# Patient Record
Sex: MALE | Race: WHITE | HISPANIC OR LATINO | Employment: OTHER | URBAN - METROPOLITAN AREA
[De-identification: names, ages, dates, MRNs, and addresses within clinical notes are randomized per-mention and may not be internally consistent; named-entity substitution may affect disease eponyms.]

---

## 2021-07-26 ENCOUNTER — PATIENT OUTREACH (OUTPATIENT)
Dept: ADMINISTRATIVE | Facility: HOSPITAL | Age: 61
End: 2021-07-26

## 2021-08-05 ENCOUNTER — HOSPITAL ENCOUNTER (EMERGENCY)
Facility: HOSPITAL | Age: 61
Discharge: HOME | End: 2021-08-05
Attending: EMERGENCY MEDICINE
Payer: MEDICAID

## 2021-08-05 VITALS
SYSTOLIC BLOOD PRESSURE: 140 MMHG | OXYGEN SATURATION: 96 % | HEART RATE: 92 BPM | BODY MASS INDEX: 21.47 KG/M2 | HEIGHT: 70 IN | WEIGHT: 150 LBS | TEMPERATURE: 99 F | RESPIRATION RATE: 18 BRPM | DIASTOLIC BLOOD PRESSURE: 88 MMHG

## 2021-08-05 DIAGNOSIS — Z20.822 LAB TEST NEGATIVE FOR COVID-19 VIRUS: ICD-10-CM

## 2021-08-05 DIAGNOSIS — R06.02 SHORTNESS OF BREATH: Primary | ICD-10-CM

## 2021-08-05 LAB
CTP QC/QA: YES
HCV AB SERPL QL IA: NEGATIVE
HIV 1+2 AB+HIV1 P24 AG SERPL QL IA: NEGATIVE
SARS-COV-2 RDRP RESP QL NAA+PROBE: NEGATIVE

## 2021-08-05 PROCEDURE — 87389 HIV-1 AG W/HIV-1&-2 AB AG IA: CPT | Performed by: EMERGENCY MEDICINE

## 2021-08-05 PROCEDURE — 93010 ELECTROCARDIOGRAM REPORT: CPT | Mod: ,,, | Performed by: INTERNAL MEDICINE

## 2021-08-05 PROCEDURE — 99284 PR EMERGENCY DEPT VISIT,LEVEL IV: ICD-10-PCS | Mod: CR,CS,, | Performed by: EMERGENCY MEDICINE

## 2021-08-05 PROCEDURE — U0002 COVID-19 LAB TEST NON-CDC: HCPCS | Performed by: EMERGENCY MEDICINE

## 2021-08-05 PROCEDURE — 93010 EKG 12-LEAD: ICD-10-PCS | Mod: ,,, | Performed by: INTERNAL MEDICINE

## 2021-08-05 PROCEDURE — 86803 HEPATITIS C AB TEST: CPT | Performed by: EMERGENCY MEDICINE

## 2021-08-05 PROCEDURE — 99284 EMERGENCY DEPT VISIT MOD MDM: CPT | Mod: CR,CS,, | Performed by: EMERGENCY MEDICINE

## 2021-08-05 PROCEDURE — 99284 EMERGENCY DEPT VISIT MOD MDM: CPT

## 2021-08-05 PROCEDURE — 93005 ELECTROCARDIOGRAM TRACING: CPT

## 2021-12-07 ENCOUNTER — HOSPITAL ENCOUNTER (EMERGENCY)
Facility: HOSPITAL | Age: 61
Discharge: HOME | End: 2021-12-07
Attending: EMERGENCY MEDICINE
Payer: MEDICAID

## 2021-12-07 VITALS
HEART RATE: 86 BPM | BODY MASS INDEX: 21.47 KG/M2 | TEMPERATURE: 99 F | DIASTOLIC BLOOD PRESSURE: 92 MMHG | HEIGHT: 70 IN | SYSTOLIC BLOOD PRESSURE: 176 MMHG | WEIGHT: 150 LBS | RESPIRATION RATE: 18 BRPM | OXYGEN SATURATION: 97 %

## 2021-12-07 DIAGNOSIS — I10 HYPERTENSION, UNSPECIFIED TYPE: ICD-10-CM

## 2021-12-07 DIAGNOSIS — Z76.0 MEDICATION REFILL: ICD-10-CM

## 2021-12-07 DIAGNOSIS — K08.89 LOOSE, TEETH: Primary | ICD-10-CM

## 2021-12-07 PROCEDURE — 99284 EMERGENCY DEPT VISIT MOD MDM: CPT

## 2021-12-07 PROCEDURE — 99284 PR EMERGENCY DEPT VISIT,LEVEL IV: ICD-10-PCS | Mod: ,,, | Performed by: EMERGENCY MEDICINE

## 2021-12-07 PROCEDURE — 99284 EMERGENCY DEPT VISIT MOD MDM: CPT | Mod: ,,, | Performed by: EMERGENCY MEDICINE

## 2021-12-07 RX ORDER — GABAPENTIN 300 MG/1
300 CAPSULE ORAL 3 TIMES DAILY
Qty: 90 CAPSULE | Refills: 0 | Status: ON HOLD | OUTPATIENT
Start: 2021-12-07 | End: 2023-08-20

## 2021-12-07 RX ORDER — METHOCARBAMOL 500 MG/1
500 TABLET, FILM COATED ORAL 3 TIMES DAILY PRN
Qty: 30 TABLET | Refills: 0 | Status: ON HOLD | OUTPATIENT
Start: 2021-12-07 | End: 2023-08-20

## 2021-12-29 ENCOUNTER — PES CALL (OUTPATIENT)
Dept: ADMINISTRATIVE | Facility: CLINIC | Age: 61
End: 2021-12-29
Payer: MEDICAID

## 2023-08-20 ENCOUNTER — HOSPITAL ENCOUNTER (OUTPATIENT)
Facility: HOSPITAL | Age: 63
LOS: 1 days | Discharge: HOME | End: 2023-08-20
Attending: EMERGENCY MEDICINE | Admitting: STUDENT IN AN ORGANIZED HEALTH CARE EDUCATION/TRAINING PROGRAM
Payer: MEDICAID

## 2023-08-20 VITALS
RESPIRATION RATE: 18 BRPM | BODY MASS INDEX: 23.54 KG/M2 | DIASTOLIC BLOOD PRESSURE: 76 MMHG | HEART RATE: 59 BPM | SYSTOLIC BLOOD PRESSURE: 129 MMHG | OXYGEN SATURATION: 97 % | WEIGHT: 150 LBS | HEIGHT: 67 IN | TEMPERATURE: 99 F

## 2023-08-20 DIAGNOSIS — R07.9 CHEST PAIN: ICD-10-CM

## 2023-08-20 DIAGNOSIS — Z78.9 ALCOHOL USE: ICD-10-CM

## 2023-08-20 DIAGNOSIS — F14.90 COCAINE USE: ICD-10-CM

## 2023-08-20 DIAGNOSIS — E16.2 HYPOGLYCEMIA: Primary | ICD-10-CM

## 2023-08-20 DIAGNOSIS — R09.02 HYPOXIA: ICD-10-CM

## 2023-08-20 DIAGNOSIS — R00.2 PALPITATIONS: ICD-10-CM

## 2023-08-20 PROBLEM — R79.89 ELEVATED LACTIC ACID LEVEL: Status: ACTIVE | Noted: 2023-08-20

## 2023-08-20 PROBLEM — F10.90 ALCOHOL USE: Status: ACTIVE | Noted: 2023-08-20

## 2023-08-20 PROBLEM — R55 NEAR SYNCOPE: Status: ACTIVE | Noted: 2023-08-20

## 2023-08-20 PROBLEM — J96.91 RESPIRATORY FAILURE WITH HYPOXIA: Status: ACTIVE | Noted: 2023-08-20

## 2023-08-20 LAB
ALBUMIN SERPL BCP-MCNC: 3 G/DL (ref 3.5–5.2)
ALLENS TEST: ABNORMAL
ALLENS TEST: ABNORMAL
ALP SERPL-CCNC: 49 U/L (ref 55–135)
ALT SERPL W/O P-5'-P-CCNC: 16 U/L (ref 10–44)
AMPHET+METHAMPHET UR QL: NEGATIVE
ANION GAP SERPL CALC-SCNC: 15 MMOL/L (ref 8–16)
AST SERPL-CCNC: 31 U/L (ref 10–40)
BACTERIA #/AREA URNS AUTO: NORMAL /HPF
BARBITURATES UR QL SCN>200 NG/ML: NEGATIVE
BASOPHILS # BLD AUTO: 0.02 K/UL (ref 0–0.2)
BASOPHILS NFR BLD: 0.3 % (ref 0–1.9)
BENZODIAZ UR QL SCN>200 NG/ML: NEGATIVE
BILIRUB SERPL-MCNC: 0.3 MG/DL (ref 0.1–1)
BILIRUB UR QL STRIP: NEGATIVE
BUN SERPL-MCNC: 15 MG/DL (ref 6–30)
BUN SERPL-MCNC: 16 MG/DL (ref 8–23)
BZE UR QL SCN: ABNORMAL
CALCIUM SERPL-MCNC: 7.3 MG/DL (ref 8.7–10.5)
CANNABINOIDS UR QL SCN: NEGATIVE
CHLORIDE SERPL-SCNC: 110 MMOL/L (ref 95–110)
CHLORIDE SERPL-SCNC: 111 MMOL/L (ref 95–110)
CK SERPL-CCNC: 150 U/L (ref 20–200)
CLARITY UR REFRACT.AUTO: CLEAR
CO2 SERPL-SCNC: 15 MMOL/L (ref 23–29)
COLOR UR AUTO: YELLOW
CREAT SERPL-MCNC: 0.9 MG/DL (ref 0.5–1.4)
CREAT SERPL-MCNC: 0.9 MG/DL (ref 0.5–1.4)
CREAT UR-MCNC: 66 MG/DL (ref 23–375)
DIFFERENTIAL METHOD: ABNORMAL
EOSINOPHIL # BLD AUTO: 0 K/UL (ref 0–0.5)
EOSINOPHIL NFR BLD: 0.3 % (ref 0–8)
ERYTHROCYTE [DISTWIDTH] IN BLOOD BY AUTOMATED COUNT: 13.2 % (ref 11.5–14.5)
EST. GFR  (NO RACE VARIABLE): >60 ML/MIN/1.73 M^2
ETHANOL SERPL-MCNC: 175 MG/DL
ETHANOL UR-MCNC: 188 MG/DL
GLUCOSE SERPL-MCNC: 203 MG/DL (ref 70–110)
GLUCOSE SERPL-MCNC: 253 MG/DL (ref 70–110)
GLUCOSE UR QL STRIP: ABNORMAL
HCO3 UR-SCNC: 16.5 MMOL/L (ref 24–28)
HCT VFR BLD AUTO: 38 % (ref 40–54)
HCT VFR BLD CALC: 31 %PCV (ref 36–54)
HCV AB SERPL QL IA: NORMAL
HGB BLD-MCNC: 12 G/DL (ref 14–18)
HGB UR QL STRIP: NEGATIVE
HIV 1+2 AB+HIV1 P24 AG SERPL QL IA: NORMAL
HYALINE CASTS UR QL AUTO: 1 /LPF
IMM GRANULOCYTES # BLD AUTO: 0.03 K/UL (ref 0–0.04)
IMM GRANULOCYTES NFR BLD AUTO: 0.4 % (ref 0–0.5)
KETONES UR QL STRIP: ABNORMAL
LACTATE SERPL-SCNC: 1.7 MMOL/L (ref 0.5–2.2)
LDH SERPL L TO P-CCNC: 3.71 MMOL/L (ref 0.5–2.2)
LEUKOCYTE ESTERASE UR QL STRIP: NEGATIVE
LYMPHOCYTES # BLD AUTO: 2.4 K/UL (ref 1–4.8)
LYMPHOCYTES NFR BLD: 34.6 % (ref 18–48)
MAGNESIUM SERPL-MCNC: 1.9 MG/DL (ref 1.6–2.6)
MCH RBC QN AUTO: 30.2 PG (ref 27–31)
MCHC RBC AUTO-ENTMCNC: 31.6 G/DL (ref 32–36)
MCV RBC AUTO: 96 FL (ref 82–98)
METHADONE UR QL SCN>300 NG/ML: NEGATIVE
MICROSCOPIC COMMENT: NORMAL
MONOCYTES # BLD AUTO: 0.5 K/UL (ref 0.3–1)
MONOCYTES NFR BLD: 6.8 % (ref 4–15)
NEUTROPHILS # BLD AUTO: 4 K/UL (ref 1.8–7.7)
NEUTROPHILS NFR BLD: 57.6 % (ref 38–73)
NITRITE UR QL STRIP: NEGATIVE
NRBC BLD-RTO: 0 /100 WBC
OPIATES UR QL SCN: NEGATIVE
PCO2 BLDA: 34.6 MMHG (ref 35–45)
PCP UR QL SCN>25 NG/ML: NEGATIVE
PH SMN: 7.29 [PH] (ref 7.35–7.45)
PH UR STRIP: 5 [PH] (ref 5–8)
PLATELET # BLD AUTO: 314 K/UL (ref 150–450)
PMV BLD AUTO: 9.5 FL (ref 9.2–12.9)
PO2 BLDA: 27 MMHG (ref 40–60)
POC BE: -10 MMOL/L
POC IONIZED CALCIUM: 0.94 MMOL/L (ref 1.06–1.42)
POC SATURATED O2: 44 % (ref 95–100)
POC TCO2 (MEASURED): 17 MMOL/L (ref 23–29)
POC TCO2: 18 MMOL/L (ref 24–29)
POCT GLUCOSE: 138 MG/DL (ref 70–110)
POCT GLUCOSE: 35 MG/DL (ref 70–110)
POCT GLUCOSE: 56 MG/DL (ref 70–110)
POCT GLUCOSE: 74 MG/DL (ref 70–110)
POTASSIUM BLD-SCNC: 3.7 MMOL/L (ref 3.5–5.1)
POTASSIUM SERPL-SCNC: 3.8 MMOL/L (ref 3.5–5.1)
PROT SERPL-MCNC: 4.8 G/DL (ref 6–8.4)
PROT UR QL STRIP: NEGATIVE
RBC # BLD AUTO: 3.97 M/UL (ref 4.6–6.2)
RBC #/AREA URNS AUTO: 0 /HPF (ref 0–4)
SAMPLE: ABNORMAL
SARS-COV-2 RDRP RESP QL NAA+PROBE: NEGATIVE
SITE: ABNORMAL
SITE: ABNORMAL
SODIUM BLD-SCNC: 143 MMOL/L (ref 136–145)
SODIUM SERPL-SCNC: 141 MMOL/L (ref 136–145)
SP GR UR STRIP: 1.01 (ref 1–1.03)
TOXICOLOGY INFORMATION: ABNORMAL
TROPONIN I SERPL DL<=0.01 NG/ML-MCNC: 0.01 NG/ML (ref 0–0.03)
TROPONIN I SERPL DL<=0.01 NG/ML-MCNC: 0.01 NG/ML (ref 0–0.03)
URN SPEC COLLECT METH UR: ABNORMAL
WBC # BLD AUTO: 6.9 K/UL (ref 3.9–12.7)
WBC #/AREA URNS AUTO: 0 /HPF (ref 0–5)
YEAST UR QL AUTO: NORMAL

## 2023-08-20 PROCEDURE — 99900035 HC TECH TIME PER 15 MIN (STAT)

## 2023-08-20 PROCEDURE — 82803 BLOOD GASES ANY COMBINATION: CPT

## 2023-08-20 PROCEDURE — 80053 COMPREHEN METABOLIC PANEL: CPT | Performed by: EMERGENCY MEDICINE

## 2023-08-20 PROCEDURE — 84484 ASSAY OF TROPONIN QUANT: CPT | Mod: 91 | Performed by: STUDENT IN AN ORGANIZED HEALTH CARE EDUCATION/TRAINING PROGRAM

## 2023-08-20 PROCEDURE — 93005 ELECTROCARDIOGRAM TRACING: CPT

## 2023-08-20 PROCEDURE — U0002 COVID-19 LAB TEST NON-CDC: HCPCS

## 2023-08-20 PROCEDURE — 85025 COMPLETE CBC W/AUTO DIFF WBC: CPT | Performed by: EMERGENCY MEDICINE

## 2023-08-20 PROCEDURE — 83605 ASSAY OF LACTIC ACID: CPT | Performed by: STUDENT IN AN ORGANIZED HEALTH CARE EDUCATION/TRAINING PROGRAM

## 2023-08-20 PROCEDURE — 82962 GLUCOSE BLOOD TEST: CPT

## 2023-08-20 PROCEDURE — 96361 HYDRATE IV INFUSION ADD-ON: CPT

## 2023-08-20 PROCEDURE — G0378 HOSPITAL OBSERVATION PER HR: HCPCS

## 2023-08-20 PROCEDURE — 99285 EMERGENCY DEPT VISIT HI MDM: CPT | Mod: 25

## 2023-08-20 PROCEDURE — 25000003 PHARM REV CODE 250: Performed by: EMERGENCY MEDICINE

## 2023-08-20 PROCEDURE — 87040 BLOOD CULTURE FOR BACTERIA: CPT | Mod: 59 | Performed by: EMERGENCY MEDICINE

## 2023-08-20 PROCEDURE — 83735 ASSAY OF MAGNESIUM: CPT | Performed by: EMERGENCY MEDICINE

## 2023-08-20 PROCEDURE — 82550 ASSAY OF CK (CPK): CPT | Performed by: EMERGENCY MEDICINE

## 2023-08-20 PROCEDURE — 96375 TX/PRO/DX INJ NEW DRUG ADDON: CPT

## 2023-08-20 PROCEDURE — 99222 1ST HOSP IP/OBS MODERATE 55: CPT | Mod: ,,, | Performed by: STUDENT IN AN ORGANIZED HEALTH CARE EDUCATION/TRAINING PROGRAM

## 2023-08-20 PROCEDURE — 83605 ASSAY OF LACTIC ACID: CPT

## 2023-08-20 PROCEDURE — 96366 THER/PROPH/DIAG IV INF ADDON: CPT

## 2023-08-20 PROCEDURE — 82330 ASSAY OF CALCIUM: CPT

## 2023-08-20 PROCEDURE — 96365 THER/PROPH/DIAG IV INF INIT: CPT

## 2023-08-20 PROCEDURE — 80047 BASIC METABLC PNL IONIZED CA: CPT

## 2023-08-20 PROCEDURE — 84484 ASSAY OF TROPONIN QUANT: CPT | Performed by: EMERGENCY MEDICINE

## 2023-08-20 PROCEDURE — 87389 HIV-1 AG W/HIV-1&-2 AB AG IA: CPT | Performed by: PHYSICIAN ASSISTANT

## 2023-08-20 PROCEDURE — 99222 PR INITIAL HOSPITAL CARE,LEVL II: ICD-10-PCS | Mod: ,,, | Performed by: STUDENT IN AN ORGANIZED HEALTH CARE EDUCATION/TRAINING PROGRAM

## 2023-08-20 PROCEDURE — 81001 URINALYSIS AUTO W/SCOPE: CPT | Mod: 59 | Performed by: EMERGENCY MEDICINE

## 2023-08-20 PROCEDURE — 93010 ELECTROCARDIOGRAM REPORT: CPT | Mod: ,,, | Performed by: INTERNAL MEDICINE

## 2023-08-20 PROCEDURE — 80307 DRUG TEST PRSMV CHEM ANLYZR: CPT | Performed by: EMERGENCY MEDICINE

## 2023-08-20 PROCEDURE — 93010 EKG 12-LEAD: ICD-10-PCS | Mod: ,,, | Performed by: INTERNAL MEDICINE

## 2023-08-20 PROCEDURE — 63600175 PHARM REV CODE 636 W HCPCS: Performed by: EMERGENCY MEDICINE

## 2023-08-20 PROCEDURE — 86803 HEPATITIS C AB TEST: CPT | Performed by: PHYSICIAN ASSISTANT

## 2023-08-20 PROCEDURE — 82077 ASSAY SPEC XCP UR&BREATH IA: CPT | Performed by: EMERGENCY MEDICINE

## 2023-08-20 PROCEDURE — 96360 HYDRATION IV INFUSION INIT: CPT | Mod: 59

## 2023-08-20 RX ORDER — MAG HYDROX/ALUMINUM HYD/SIMETH 200-200-20
30 SUSPENSION, ORAL (FINAL DOSE FORM) ORAL 4 TIMES DAILY PRN
Status: DISCONTINUED | OUTPATIENT
Start: 2023-08-20 | End: 2023-08-20 | Stop reason: HOSPADM

## 2023-08-20 RX ORDER — IPRATROPIUM BROMIDE AND ALBUTEROL SULFATE 2.5; .5 MG/3ML; MG/3ML
3 SOLUTION RESPIRATORY (INHALATION) EVERY 4 HOURS PRN
Status: DISCONTINUED | OUTPATIENT
Start: 2023-08-20 | End: 2023-08-20 | Stop reason: HOSPADM

## 2023-08-20 RX ORDER — IBUPROFEN 200 MG
16 TABLET ORAL
Status: DISCONTINUED | OUTPATIENT
Start: 2023-08-20 | End: 2023-08-20 | Stop reason: HOSPADM

## 2023-08-20 RX ORDER — BISACODYL 10 MG
10 SUPPOSITORY, RECTAL RECTAL DAILY PRN
Status: DISCONTINUED | OUTPATIENT
Start: 2023-08-20 | End: 2023-08-20 | Stop reason: HOSPADM

## 2023-08-20 RX ORDER — POLYETHYLENE GLYCOL 3350 17 G/17G
17 POWDER, FOR SOLUTION ORAL DAILY PRN
Status: DISCONTINUED | OUTPATIENT
Start: 2023-08-20 | End: 2023-08-20 | Stop reason: HOSPADM

## 2023-08-20 RX ORDER — SODIUM CHLORIDE 0.9 % (FLUSH) 0.9 %
5 SYRINGE (ML) INJECTION
Status: DISCONTINUED | OUTPATIENT
Start: 2023-08-20 | End: 2023-08-20 | Stop reason: HOSPADM

## 2023-08-20 RX ORDER — NALOXONE HCL 0.4 MG/ML
0.02 VIAL (ML) INJECTION
Status: DISCONTINUED | OUTPATIENT
Start: 2023-08-20 | End: 2023-08-20 | Stop reason: HOSPADM

## 2023-08-20 RX ORDER — TALC
6 POWDER (GRAM) TOPICAL NIGHTLY PRN
Status: DISCONTINUED | OUTPATIENT
Start: 2023-08-20 | End: 2023-08-20 | Stop reason: HOSPADM

## 2023-08-20 RX ORDER — ONDANSETRON 8 MG/1
8 TABLET, ORALLY DISINTEGRATING ORAL EVERY 8 HOURS PRN
Status: DISCONTINUED | OUTPATIENT
Start: 2023-08-20 | End: 2023-08-20 | Stop reason: HOSPADM

## 2023-08-20 RX ORDER — ENOXAPARIN SODIUM 100 MG/ML
40 INJECTION SUBCUTANEOUS EVERY 24 HOURS
Status: DISCONTINUED | OUTPATIENT
Start: 2023-08-20 | End: 2023-08-20 | Stop reason: HOSPADM

## 2023-08-20 RX ORDER — SIMETHICONE 80 MG
1 TABLET,CHEWABLE ORAL 4 TIMES DAILY PRN
Status: DISCONTINUED | OUTPATIENT
Start: 2023-08-20 | End: 2023-08-20 | Stop reason: HOSPADM

## 2023-08-20 RX ORDER — IBUPROFEN 200 MG
24 TABLET ORAL
Status: DISCONTINUED | OUTPATIENT
Start: 2023-08-20 | End: 2023-08-20 | Stop reason: HOSPADM

## 2023-08-20 RX ORDER — GLUCAGON 1 MG
1 KIT INJECTION
Status: DISCONTINUED | OUTPATIENT
Start: 2023-08-20 | End: 2023-08-20 | Stop reason: HOSPADM

## 2023-08-20 RX ORDER — MAGNESIUM SULFATE HEPTAHYDRATE 40 MG/ML
2 INJECTION, SOLUTION INTRAVENOUS ONCE
Status: COMPLETED | OUTPATIENT
Start: 2023-08-20 | End: 2023-08-20

## 2023-08-20 RX ORDER — ACETAMINOPHEN 500 MG
1000 TABLET ORAL EVERY 8 HOURS PRN
Status: DISCONTINUED | OUTPATIENT
Start: 2023-08-20 | End: 2023-08-20 | Stop reason: HOSPADM

## 2023-08-20 RX ORDER — ACETAMINOPHEN 325 MG/1
650 TABLET ORAL EVERY 4 HOURS PRN
Status: DISCONTINUED | OUTPATIENT
Start: 2023-08-20 | End: 2023-08-20 | Stop reason: HOSPADM

## 2023-08-20 RX ORDER — PROCHLORPERAZINE EDISYLATE 5 MG/ML
5 INJECTION INTRAMUSCULAR; INTRAVENOUS EVERY 6 HOURS PRN
Status: DISCONTINUED | OUTPATIENT
Start: 2023-08-20 | End: 2023-08-20 | Stop reason: HOSPADM

## 2023-08-20 RX ADMIN — SODIUM CHLORIDE, POTASSIUM CHLORIDE, SODIUM LACTATE AND CALCIUM CHLORIDE 1000 ML: 600; 310; 30; 20 INJECTION, SOLUTION INTRAVENOUS at 04:08

## 2023-08-20 RX ADMIN — SODIUM CHLORIDE, POTASSIUM CHLORIDE, SODIUM LACTATE AND CALCIUM CHLORIDE 1000 ML: 600; 310; 30; 20 INJECTION, SOLUTION INTRAVENOUS at 05:08

## 2023-08-20 RX ADMIN — MAGNESIUM SULFATE HEPTAHYDRATE 2 G: 40 INJECTION, SOLUTION INTRAVENOUS at 05:08

## 2023-08-20 RX ADMIN — DEXTROSE MONOHYDRATE 250 ML: 10 INJECTION, SOLUTION INTRAVENOUS at 05:08

## 2023-08-20 NOTE — PLAN OF CARE
[facility] Emergency Dept  Initial Discharge Assessment       Primary Care Provider: Nanette, Primary Doctor    Admission Diagnosis: Palpitations [R00.2]  Hypoglycemia [E16.2]    Admission Date: 8/20/2023  Expected Discharge Date:     Pt stated he is independent with his ambulation and ADL's and does not require assistance or use equipment.    Pt stated he lives with 'cousins' and can d/c home with no needs when ready    Transition of Care Barriers: None    Payor: MEDICAID / Plan: [place] [employer] / Product Type: Managed Medicaid /     Extended Emergency Contact Information  Primary Emergency Contact: Nellie Humphries  Mobile Phone: [phone]  Relation: Significant other    Discharge Plan A: Home  Discharge Plan B: Home    No Pharmacies Listed    Initial Assessment (most recent)       Adult Discharge Assessment - 08/20/23 0850          Discharge Assessment    Assessment Type Discharge Planning Assessment     Confirmed/corrected address, phone number and insurance Yes     Confirmed Demographics Correct on Facesheet     Source of Information patient     Reason For Admission Respiratory failure with hypoxia     People in Home other relative(s)     Facility Arrived From: home     Do you expect to return to your current living situation? Yes     Do you have help at home or someone to help you manage your care at home? No     Prior to hospitilization cognitive status: Alert/Oriented;No Deficits     Current cognitive status: Alert/Oriented;No Deficits     Home Accessibility wheelchair accessible     Home Layout Able to live on 1st floor     Equipment Currently Used at Home none     Patient currently being followed by outpatient case management? No     Do you currently have service(s) that help you manage your care at home? No     Do you have any problems affording any of your prescribed medications? No     Is the patient taking medications as prescribed? yes     Who is going to help you get home at discharge? family     How  do you get to doctors appointments? car, drives self;family or friend will provide     Are you on dialysis? No     Do you take coumadin? No     DME Needed Upon Discharge  none     Discharge Plan discussed with: Patient     Transition of Care Barriers None     Discharge Plan A Home     Discharge Plan B Home        Financial Resource Strain    How hard is it for you to pay for the very basics like food, housing, medical care, and heating? Not very hard        Housing Stability    In the last 12 months, was there a time when you were not able to pay the mortgage or rent on time? No     In the last 12 months, was there a time when you did not have a steady place to sleep or slept in a shelter (including now)? No        Transportation Needs    In the past 12 months, has lack of transportation kept you from medical appointments or from getting medications? No     In the past 12 months, has lack of transportation kept you from meetings, work, or from getting things needed for daily living? No        Food Insecurity    Within the past 12 months, you worried that your food would run out before you got the money to buy more. Never true     Within the past 12 months, the food you bought just didn't last and you didn't have money to get more. Never true        OTHER    Name(s) of People in Home cousins/relatives                      Celestina Villarreal CD, MSW, LMSW, RSW   Case Management  [facility]  Email: [email or web address]

## 2023-08-20 NOTE — HPI
Patient is a 62-year-old male with unknown past medical history presents to the emergency department via EMS this morning.  Patient states that he was out all day working in the sun, then went to have a couple of drinks stating that he drank hard alcohol, and not much other oral intake.  On arrival patient was a little lethargic point of care glucose was 34.  Patient does admit having some mild chest pain, described as burning versus pressure in his chest.  He has never had any of these symptoms before.  He denies having a history of diabetes.  He denies new medications or or infection.  He denies nausea, vomiting, tremulousness, previous episode of hypoglycemia.     EMS found a point of care glucose in the 40s and give him D50.  Repeat glucose in the emergency department was 34, so he got another bolus of D50.  Patient was given orange juice in the emergency room with improvement in his symptoms, i.e. lethargy and slow speaking.       Endorses cocaine and alcohol use after working all day without much other oral intake.

## 2023-08-20 NOTE — ASSESSMENT & PLAN NOTE
- elevated to 3.71, likely secondary to alcohol/cocaine and decreased oral intake  - trend and cont IVF

## 2023-08-20 NOTE — PLAN OF CARE
SW spoke briefly with pt about substance abuse treatment.  Pt stated he did not want that as he has to go out of town for work for 2 months, and he will 'maybe' think about it when he comes back.        Celestina Villarreal, BHARGAVI, MSW, LMSW, RSW   Case Management  [facility]  Email: [email or web address]

## 2023-08-20 NOTE — PHARMACY MED REC
"Admission Medication History     The home medication history was taken by Levar Villatoro.    You may go to "Admission" then "Reconcile Home Medications" tabs to review and/or act upon these items.     The home medication list has been updated by the Pharmacy department.   Please read ALL comments highlighted in yellow.   Please address this information as you see fit.    Feel free to contact us if you have any questions or require assistance.      The medications listed below were removed from the home medication list. Please reorder if appropriate:  Patient reports no longer taking the following medication(s):  GABAPENTIN 300 MG CAPSULE  METHOCARBAMOL 500 MG TABLET  AMITRIPTYLINE 50 MG TABLET      PT IS CURRENTLY NOT TAKING ANY MEDICATIONS.    Potential issues to be addressed PRIOR TO DISCHARGE  Please discuss with the patient barriers to adherence with medication treatment plans  Patient requires education regarding drug therapies     Levar Villatoro  EXT [id number]                  .          "

## 2023-08-20 NOTE — Clinical Note
Diagnosis: Palpitations [785.1.ICD-9-CM]   Admitting Provider:: YARITZA TEJADA [[id number]]   Future Attending Provider: YARITZA TEJADA [[id number]]   Reason for IP Medical Treatment  (Clinical interventions that can only be accomplished in the IP setting? ) :: Acute hypoxic respiratory failure   I certify that Inpatient services for greater than or equal to 2 midnights are medically necessary:: Yes   Plans for Post-Acute care--if anticipated (pick the single best option):: A. No post acute care anticipated at this time

## 2023-08-20 NOTE — ASSESSMENT & PLAN NOTE
- hypoglycemic in the field likely 2/2 no oral intake and alcohol use, resolved with D50 and oral intake.   - cont oral intake  - monitor BG and hypoglycemic protocol in place

## 2023-08-20 NOTE — ED TRIAGE NOTES
Mac Alvarado, a 62 y.o. male presents to the ED w/ complaint of near syncope, palpitations, generalized fatigue and dehydration. 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     Triage note:  Chief Complaint   Patient presents with    Near Syncope     C/o near syncope, dehydration, and palpitations. Cbg in 40s, ems gave d50. Last check in 50s     Review of patient's allergies indicates:  No Known Allergies  No past medical history on file. Patient identifiers for Mac Alvarado checked and correct.    LOC: The patient is awake, alert and aware of environment with an appropriate affect, the patient is oriented x 4 and speaking appropriately.    APPEARANCE: Patient resting comfortably and in no acute distress, patient is clean and well groomed, patient's clothing is properly fastened.    SKIN: The skin is warm and dry, color consistent with ethnicity, patient has normal skin turgor and moist mucus membranes, skin intact, no breakdown or bruising noted.    MUSCULOSKELETAL: Patient moving all extremities well, no obvious swelling or deformities noted. +generalized fatigue    RESPIRATORY: Airway is open and patent, respirations are spontaneous and even, patient has a normal effort and rate.    CARDIAC: Patient has a normal rate and rhythm, no periphreal edema noted, capillary refill < 3 seconds. L sided CP    ABDOMEN: Soft and non tender to palpation, no distention noted. Patient denies any nausea, vomiting, diarrhea, or constipation.     NEUROLOGIC: Eyes open spontaneously, PERRL, behavior appropriate to situation, follows commands, facial expression symmetrical, bilateral hand grasp equal and even, purposeful motor response noted, normal sensation in all extremities.     HEENT: No abnormalities noted. White sclera and pupils equal round and reactive to light. Denies headache, dizziness.     : Pt voids independently, denies dysuria, hematuria, frequency.

## 2023-08-20 NOTE — SUBJECTIVE & OBJECTIVE
History reviewed. No pertinent past medical history.    History reviewed. No pertinent surgical history.    Review of patient's allergies indicates:  No Known Allergies    No current facility-administered medications on file prior to encounter.     Current Outpatient Medications on File Prior to Encounter   Medication Sig    gabapentin (NEURONTIN) 300 MG capsule Take 1 capsule (300 mg total) by mouth 3 (three) times daily.    methocarbamoL (ROBAXIN) 500 MG Tab Take 1 tablet (500 mg total) by mouth 3 (three) times daily as needed (muscle tightness). Do not drive or operate heavy machine while taking this medication.     Family History    None       Tobacco Use    Smoking status: Never    Smokeless tobacco: Never   Substance and Sexual Activity    Alcohol use: Never    Drug use: Never    Sexual activity: Not on file     Review of Systems   Constitutional:  Negative for chills and fever.   HENT:  Negative for trouble swallowing.    Respiratory:  Positive for chest tightness. Negative for shortness of breath.         Chest burning     Cardiovascular:  Positive for palpitations. Negative for chest pain.   Genitourinary:  Negative for difficulty urinating.   Musculoskeletal:  Positive for arthralgias.        Chronic arthritis   Skin:  Negative for wound.   Neurological:  Positive for light-headedness. Negative for syncope.        Near syncope, felt lightheaded but did not pass out   Psychiatric/Behavioral:  Negative for confusion.      Objective:     Vital Signs (Most Recent):  Temp: 97 °F (36.1 °C) (08/20/23 0423)  Pulse: 82 (08/20/23 0751)  Resp: 20 (08/20/23 0749)  BP: 119/61 (08/20/23 0751)  SpO2: 95 % (08/20/23 0751) Vital Signs (24h Range):  Temp:  [97 °F (36.1 °C)] 97 °F (36.1 °C)  Pulse:  [81-97] 82  Resp:  [19-38] 20  SpO2:  [89 %-98 %] 95 %  BP: ()/(55-71) 119/61     Weight: 68 kg (150 lb)  Body mass index is 21.52 kg/m².     Physical Exam  Vitals and nursing note reviewed.   Constitutional:       General:  He is not in acute distress.     Appearance: Normal appearance.   HENT:      Head: Normocephalic and atraumatic.      Nose: Nose normal.      Comments: NC in place but not hooked up to O2     Mouth/Throat:      Mouth: Mucous membranes are moist.      Pharynx: Oropharynx is clear.   Eyes:      Extraocular Movements: Extraocular movements intact.      Conjunctiva/sclera: Conjunctivae normal.      Pupils: Pupils are equal, round, and reactive to light.   Cardiovascular:      Rate and Rhythm: Normal rate and regular rhythm.      Pulses: Normal pulses.      Heart sounds: Normal heart sounds.      Comments: Intermittent PVC heard  Pulmonary:      Effort: Pulmonary effort is normal. No respiratory distress.      Breath sounds: Normal breath sounds. No wheezing.      Comments: Stable on RA  Chest:      Chest wall: No tenderness.   Abdominal:      General: Abdomen is flat. Bowel sounds are normal. There is no distension.      Palpations: Abdomen is soft.      Tenderness: There is no abdominal tenderness.   Musculoskeletal:         General: No swelling. Normal range of motion.      Cervical back: Normal range of motion and neck supple.   Skin:     General: Skin is warm and dry.   Neurological:      General: No focal deficit present.      Mental Status: He is alert and oriented to person, place, and time.   Psychiatric:         Mood and Affect: Mood normal.         Behavior: Behavior normal.              CRANIAL NERVES     CN III, IV, VI   Pupils are equal, round, and reactive to light.       Significant Labs: All pertinent labs within the past 24 hours have been reviewed.    Significant Imaging: I have reviewed all pertinent imaging results/findings within the past 24 hours.

## 2023-08-20 NOTE — ASSESSMENT & PLAN NOTE
- noted alcohol use and Ethanol 175 on admit  - likely cause of sxs  - counseled on cessation, will provide resources for cessation

## 2023-08-20 NOTE — ASSESSMENT & PLAN NOTE
- patient reports lightheadedness after decreased oral intake and working all day   - also reports alcohol and cocaine use   - orthostatic vitals ordered  - cont IVF and electrolyte replacement  - fall precautions

## 2023-08-20 NOTE — ASSESSMENT & PLAN NOTE
Patient with Hypoxic Respiratory failure which is Acute.  he is not on home oxygen. Supplemental oxygen was provided and noted-      .   Signs/symptoms of respiratory failure include- hypoxia. Contributing diagnoses includes - Aspiration Labs and images were reviewed. Patient Has recent ABG, which has been reviewed. Will treat underlying causes and adjust management of respiratory failure as follows-     Supplemental O2 started in ED, quickly weaned to RA, maintaining sats >95  CXR with bibasilar atelectasis- IS added  - afebrile and no leukocytosis   - aspiration likely 2/2 alcohol intoxication   - monitor respiratory status

## 2023-08-20 NOTE — H&P
[facility] Dept  [facility]  History & Physical    Patient Name: Mac Alvarado  MRN: [id number]  Patient Class: OP- Observation  Admission Date: 8/20/2023  Attending Physician: Kailey Carbajal MD   Primary Care Provider: Nanette, Primary Doctor         Patient information was obtained from patient and ER records.     Subjective:     Principal Problem:Respiratory failure with hypoxia    Chief Complaint:   Chief Complaint   Patient presents with    Near Syncope     C/o near syncope, dehydration, and palpitations. Cbg in 40s, ems gave d50. Last check in 50s        HPI: Patient is a 62-year-old male with unknown past medical history presents to the emergency department via EMS this morning.  Patient states that he was out all day working in the sun, then went to have a couple of drinks stating that he drank hard alcohol, and not much other oral intake.  On arrival patient was a little lethargic point of care glucose was 34.  Patient does admit having some mild chest pain, described as burning versus pressure in his chest.  He has never had any of these symptoms before.  He denies having a history of diabetes.  He denies new medications or or infection.  He denies nausea, vomiting, tremulousness, previous episode of hypoglycemia.     EMS found a point of care glucose in the 40s and give him D50.  Repeat glucose in the emergency department was 34, so he got another bolus of D50.  Patient was given orange juice in the emergency room with improvement in his symptoms, i.e. lethargy and slow speaking.       Endorses cocaine and alcohol use after working all day without much other oral intake.       History reviewed. No pertinent past medical history.    History reviewed. No pertinent surgical history.    Review of patient's allergies indicates:  No Known Allergies    No current facility-administered medications on file prior to encounter.     Current Outpatient Medications on File Prior to Encounter    Medication Sig    gabapentin (NEURONTIN) 300 MG capsule Take 1 capsule (300 mg total) by mouth 3 (three) times daily.    methocarbamoL (ROBAXIN) 500 MG Tab Take 1 tablet (500 mg total) by mouth 3 (three) times daily as needed (muscle tightness). Do not drive or operate heavy machine while taking this medication.     Family History    None       Tobacco Use    Smoking status: Never    Smokeless tobacco: Never   Substance and Sexual Activity    Alcohol use: Never    Drug use: Never    Sexual activity: Not on file     Review of Systems   Constitutional:  Negative for chills and fever.   HENT:  Negative for trouble swallowing.    Respiratory:  Positive for chest tightness. Negative for shortness of breath.         Chest burning     Cardiovascular:  Positive for palpitations. Negative for chest pain.   Genitourinary:  Negative for difficulty urinating.   Musculoskeletal:  Positive for arthralgias.        Chronic arthritis   Skin:  Negative for wound.   Neurological:  Positive for light-headedness. Negative for syncope.        Near syncope, felt lightheaded but did not pass out   Psychiatric/Behavioral:  Negative for confusion.      Objective:     Vital Signs (Most Recent):  Temp: 97 °F (36.1 °C) (08/20/23 0423)  Pulse: 82 (08/20/23 0751)  Resp: 20 (08/20/23 0749)  BP: 119/61 (08/20/23 0751)  SpO2: 95 % (08/20/23 0751) Vital Signs (24h Range):  Temp:  [97 °F (36.1 °C)] 97 °F (36.1 °C)  Pulse:  [81-97] 82  Resp:  [19-38] 20  SpO2:  [89 %-98 %] 95 %  BP: ()/(55-71) 119/61     Weight: 68 kg (150 lb)  Body mass index is 21.52 kg/m².     Physical Exam  Vitals and nursing note reviewed.   Constitutional:       General: He is not in acute distress.     Appearance: Normal appearance.   HENT:      Head: Normocephalic and atraumatic.      Nose: Nose normal.      Comments: NC in place but not hooked up to O2     Mouth/Throat:      Mouth: Mucous membranes are moist.      Pharynx: Oropharynx is clear.   Eyes:       Extraocular Movements: Extraocular movements intact.      Conjunctiva/sclera: Conjunctivae normal.      Pupils: Pupils are equal, round, and reactive to light.   Cardiovascular:      Rate and Rhythm: Normal rate and regular rhythm.      Pulses: Normal pulses.      Heart sounds: Normal heart sounds.      Comments: Intermittent PVC heard  Pulmonary:      Effort: Pulmonary effort is normal. No respiratory distress.      Breath sounds: Normal breath sounds. No wheezing.      Comments: Stable on RA  Chest:      Chest wall: No tenderness.   Abdominal:      General: Abdomen is flat. Bowel sounds are normal. There is no distension.      Palpations: Abdomen is soft.      Tenderness: There is no abdominal tenderness.   Musculoskeletal:         General: No swelling. Normal range of motion.      Cervical back: Normal range of motion and neck supple.   Skin:     General: Skin is warm and dry.   Neurological:      General: No focal deficit present.      Mental Status: He is alert and oriented to person, place, and time.   Psychiatric:         Mood and Affect: Mood normal.         Behavior: Behavior normal.              CRANIAL NERVES     CN III, IV, VI   Pupils are equal, round, and reactive to light.       Significant Labs: All pertinent labs within the past 24 hours have been reviewed.    Significant Imaging: I have reviewed all pertinent imaging results/findings within the past 24 hours.    Assessment/Plan:     * Respiratory failure with hypoxia  Patient with Hypoxic Respiratory failure which is Acute.  he is not on home oxygen. Supplemental oxygen was provided and noted-      .   Signs/symptoms of respiratory failure include- hypoxia. Contributing diagnoses includes - Aspiration Labs and images were reviewed. Patient Has recent ABG, which has been reviewed. Will treat underlying causes and adjust management of respiratory failure as follows-     Supplemental O2 started in ED, quickly weaned to RA, maintaining sats >95  CXR  with bibasilar atelectasis- IS added  - afebrile and no leukocytosis   - aspiration likely 2/2 alcohol intoxication   - monitor respiratory status    Palpitations  - PVC noted on EKG  - trop neg      Elevated lactic acid level  - elevated to 3.71, likely secondary to alcohol/cocaine and decreased oral intake  - trend and cont IVF     Cocaine use  - noted on UDS  - likely cause of pts sxs along with alcohol  - counseled on cessation and given resources      Alcohol use  - noted alcohol use and Ethanol 175 on admit  - likely cause of sxs  - counseled on cessation, will provide resources for cessation       Near syncope  - patient reports lightheadedness after decreased oral intake and working all day   - also reports alcohol and cocaine use   - orthostatic vitals ordered  - cont IVF and electrolyte replacement  - fall precautions       Hypoglycemia  - hypoglycemic in the field likely 2/2 no oral intake and alcohol use, resolved with D50 and oral intake.   - cont oral intake  - monitor BG and hypoglycemic protocol in place        VTE Risk Mitigation (From admission, onward)         Ordered     enoxaparin injection 40 mg  Daily         08/20/23 0845                   On 08/20/2023, patient should be placed in hospital observation services under my care.        Kailey Carbajal MD  Department of Hospital Medicine  [facility]

## 2023-08-20 NOTE — PLAN OF CARE
ON-CALL: On-Call SW receiving a phone call from nurse regarding [employer] ride to transport pt home. SW arriving on the unit to assist pt.. Pt. at Nurse's Station and states he has bus fare and wants to take the bus home.    Erin Jeter LMSW

## 2023-08-20 NOTE — ED PROVIDER NOTES
Encounter Date: 8/20/2023       History     Chief Complaint   Patient presents with    Near Syncope     C/o near syncope, dehydration, and palpitations. Cbg in 40s, ems gave d50. Last check in 50s     Patient is a 62-year-old male with unknown past medical history presents to the emergency department via EMS this morning.  Patient states that he was out all day working in the sun, then went to have a couple of drinks stating that he drank hard alcohol, and not much other oral intake.  On arrival patient was a little lethargic point of care glucose was 34.  Patient does admit having some mild chest pain, described as burning versus pressure in his chest.  He has never had any of these symptoms before.  He denies having a history of diabetes.  He denies new medications or or infection.  He denies nausea, vomiting, tremulousness, previous episode of hypoglycemia.    EMS found a point of care glucose in the 40s and give him D50.  Repeat glucose in the emergency department was 34, so he got another bolus of D50.  Patient was given orange juice in the emergency room with improvement in his symptoms, i.e. lethargy and slow speaking.     The history is provided by the patient.   ,  Review of patient's allergies indicates:  No Known Allergies  History reviewed. No pertinent past medical history.  History reviewed. No pertinent surgical history.  History reviewed. No pertinent family history.  Social History     Tobacco Use    Smoking status: Never    Smokeless tobacco: Never   Substance Use Topics    Alcohol use: Never    Drug use: Never     Review of Systems  ROS negative except as noted in HPI     Physical Exam     Initial Vitals [08/20/23 0423]   BP Pulse Resp Temp SpO2   (!) 97/55 97 (!) 23 97 °F (36.1 °C) 98 %      MAP       --         Physical Exam  Gen:  Thin, chronic ill-appearing appearing, no acute distress, following directions, A&Ox3   Skin: no diaphoresis, no cyanosis, rash on grossly inspected skin   HEENT:  oral mucosa moist, oral cavity with out lesions, head non traumatic   CV: regular rate, normal rhythm, S1 and S2 appreciated, no extra heart sounds or murmurs  Pul: clear to auscultation in all lung fields, good respiratory effort, no other lung sounds appreciated   Abd: flat abdomen, no scars or lesions, soft, no abdominal masses, non-tender to palpation   Ext: Distal pulses +2 in B/L LE and UE, warm to the touch,   Neuro: Sensation to light touch intact, no focal deficit. MS 5/5 in B/L UE and LE. Cranial Nerves 2-12 grossly intact.       ED Course   Procedures  Labs Reviewed   CBC W/ AUTO DIFFERENTIAL - Abnormal; Notable for the following components:       Result Value    RBC 3.97 (*)     Hemoglobin 12.0 (*)     Hematocrit 38.0 (*)     MCHC 31.6 (*)     All other components within normal limits    Narrative:     ADD ON MAGNESIUM PER DR ALDEN HOLLY/ORDER# [id number] @ 5:09AM      Release to patient->Immediate   COMPREHENSIVE METABOLIC PANEL - Abnormal; Notable for the following components:    Chloride 111 (*)     CO2 15 (*)     Glucose 203 (*)     Calcium 7.3 (*)     Total Protein 4.8 (*)     Albumin 3.0 (*)     Alkaline Phosphatase 49 (*)     All other components within normal limits    Narrative:     ADD ON MAGNESIUM PER DR ALDEN HOLLY/ORDER# [id number] @ 5:09AM      Release to patient->Immediate   ALCOHOL,MEDICAL (ETHANOL) - Abnormal; Notable for the following components:    Alcohol, Serum 175 (*)     All other components within normal limits    Narrative:     ADD ON MAGNESIUM PER DR ALDEN HOLLY/ORDER# [id number] @ 5:09AM      Release to patient->Immediate   POCT GLUCOSE - Abnormal; Notable for the following components:    POCT Glucose 35 (*)     All other components within normal limits   ISTAT LACTATE - Abnormal; Notable for the following components:    POC Lactate 3.71 (*)     All other components within normal limits   ISTAT PROCEDURE - Abnormal; Notable for the following components:    POC Glucose  253 (*)     POC TCO2 (MEASURED) 17 (*)     POC Ionized Calcium 0.94 (*)     POC Hematocrit 31 (*)     All other components within normal limits   ISTAT PROCEDURE - Abnormal; Notable for the following components:    POC PH 7.288 (*)     POC PCO2 34.6 (*)     POC PO2 27 (*)     POC HCO3 16.5 (*)     POC SATURATED O2 44 (*)     POC TCO2 18 (*)     All other components within normal limits   CULTURE, BLOOD   CULTURE, BLOOD   HIV 1 / 2 ANTIBODY    Narrative:     ADD ON MAGNESIUM PER DR ALDEN HOLLY/ORDER# [id number] @ 5:09AM      Release to patient->Immediate   HEPATITIS C ANTIBODY    Narrative:     ADD ON MAGNESIUM PER DR ALDEN HOLLY/ORDER# [id number] @ 5:09AM      Release to patient->Immediate   TROPONIN I    Narrative:     ADD ON MAGNESIUM PER DR ALDEN HOLLY/ORDER# [id number] @ 5:09AM      Release to patient->Immediate   TOXICOLOGY SCREEN, URINE, RANDOM (COMPLIANCE)    Narrative:     Specimen Source->Urine   CK    Narrative:     ADD ON MAGNESIUM PER DR ALDEN HOLLY/ORDER# [id number] @ 5:09AM      Release to patient->Immediate   MAGNESIUM   MAGNESIUM    Narrative:     ADD ON MAGNESIUM PER DR ALDEN HOLLY/ORDER# [id number] @ 5:09AM      Release to patient->Immediate   URINALYSIS, REFLEX TO URINE CULTURE   SARS-COV-2 RNA AMPLIFICATION, QUAL   ISTAT CHEM8     EKG Readings: (Independently Interpreted)   Normal sinus rhythm, no ST elevations or depressions, frequent PVCs, prolonged QT interval       Imaging Results              X-Ray Chest AP Portable (In process)                   X-Rays:   Independently Interpreted Readings:   Chest X-Ray: No acute cardiopulmonary disease, no pneumothorax, no evidence of consolidation     Medications   magnesium sulfate 2g in water 50mL IVPB (premix) (2 g Intravenous New Bag 8/20/23 0542)   lactated ringers bolus 1,000 mL (0 mLs Intravenous Stopped 8/20/23 0609)   dextrose 10% bolus 250 mL 250 mL (0 mLs Intravenous Stopped 8/20/23 0546)   lactated ringers bolus 1,000 mL (1,000  mLs Intravenous New Bag 8/20/23 0540)     Medical Decision Making  Patient is a 62-year-old male with unknown past medical history presents to the emergency department via EMS this morning.  Patient hypoglycemic in the field, again on presentation to the emergency department.  Shortly after presentation developed acute hypoxic respiratory failure.    Differential includes aspiration pneumonia, pneumonia of viral or bacterial etiology, toxidrome secondary to alcohol use, unclear etiology for hypoglycemia, does not have diabetes and does not use any insulin or antidiabetic medications.  Potential for heat exposure leading to symptomatology.    Workup was broad given unclear etiology for hypo glycemia.  Included metabolic workup, infectious workup, cardiac workup.  Given unwitnessed fall/faint we will also obtain a CT head.    Significant labs include elevated lactic acid, hypoglycemia to 35 requiring intervention by both EMS and our emergency department.  His alcohol is elevated to 175 and he has a metabolic acidosis from the VBG    Plan:  Fluid resuscitation with 2 L LR, supplemental O2 to maintain oxygen saturations greater than 90%  Admit to hospital medicine for further monitoring and workup given acute hypoxic respiratory failure.    Amount and/or Complexity of Data Reviewed  External Data Reviewed: notes.  Labs: ordered. Decision-making details documented in ED Course.  Radiology: ordered and independent interpretation performed. Decision-making details documented in ED Course.  ECG/medicine tests: ordered and independent interpretation performed. Decision-making details documented in ED Course.    Risk  Prescription drug management.  Drug therapy requiring intensive monitoring for toxicity.  Decision regarding hospitalization.               ED Course as of 08/20/23 0654   Sun Aug 20, 2023   0558 POC PH(!): 7.288 [TK]   0558 POC PCO2(!): 34.6 [TK]   0558 POC Lactate(!!): 3.71 [TK]   0558 Alcohol, Serum(!): 175  [TK]   0558 Hemoglobin(!): 12.0 [TK]   0558 CO2(!): 15 [TK]   0558 POCT Glucose(!!): 35 [TK]      ED Course User Index  [TK] Mya Godoy DO                    Clinical Impression:   Final diagnoses:  [R00.2] Palpitations  [E16.2] Hypoglycemia (Primary)  [R09.02] Hypoxia        ED Disposition Condition    Admit Stable                Mya Godoy DO  Resident  08/20/23 9582

## 2023-08-20 NOTE — ASSESSMENT & PLAN NOTE
- noted on UDS  - likely cause of pts sxs along with alcohol  - counseled on cessation and given resources

## 2023-08-20 NOTE — PROVIDER PROGRESS NOTES - EMERGENCY DEPT.
Encounter Date: 8/20/2023    ED Physician Progress Notes        ED Resident HAND-OFF NOTE:  Mac Alvarado is a 62 y.o. male who presented to the ED on 8/20/2023, patient C/O ***. I assumed care of patient from off-going ED physician team patient pending ***.    On my evaluation, Mac Alvarado appears well, hemodynamically stable and in NAD. Thus far, Mac Alvarado has received:  Medications   lactated ringers bolus 1,000 mL (1,000 mLs Intravenous New Bag 8/20/23 0540)   magnesium sulfate 2g in water 50mL IVPB (premix) (2 g Intravenous New Bag 8/20/23 0542)   lactated ringers bolus 1,000 mL (0 mLs Intravenous Stopped 8/20/23 0609)   dextrose 10% bolus 250 mL 250 mL (0 mLs Intravenous Stopped 8/20/23 0546)       /66 (BP Location: Left arm, Patient Position: Lying)   Pulse 81   Temp 97 °F (36.1 °C) (Oral)   Resp 20   Wt 68 kg (150 lb)   SpO2 98%   BMI 21.52 kg/m²         Disposition: I anticipate patient will ***  ______________________  Gee Zamora MD   Emergency Medicine Resident      UPDATE:         :  Palpitations

## 2023-08-21 NOTE — ASSESSMENT & PLAN NOTE
- elevated to 3.71, likely secondary to alcohol/cocaine and decreased oral intake  - trend and cont IVF     - lactate normalized

## 2023-08-21 NOTE — DISCHARGE SUMMARY
[facility] - Intensive Care ([place] [address])  [facility] Medicine  Discharge Summary      Patient Name: Mac Alvarado  MRN: [id number]  ANGELICA: [id number]  Patient Class: OP- Observation  Admission Date: 8/20/2023  Hospital Length of Stay: 1 days  Discharge Date and Time: No discharge date for patient encounter.  Attending Physician: Kailey Carbajal MD   Discharging Provider: Kailey Carbajal MD  Primary Care Provider: Nanette, Primary Doctor  [facility] Medicine Team: [facility] D Kailey Carbajal MD  Primary Care Team: [facility] D    HPI:   Patient is a 62-year-old male with unknown past medical history presents to the emergency department via EMS this morning.  Patient states that he was out all day working in the sun, then went to have a couple of drinks stating that he drank hard alcohol, and not much other oral intake.  On arrival patient was a little lethargic point of care glucose was 34.  Patient does admit having some mild chest pain, described as burning versus pressure in his chest.  He has never had any of these symptoms before.  He denies having a history of diabetes.  He denies new medications or or infection.  He denies nausea, vomiting, tremulousness, previous episode of hypoglycemia.     EMS found a point of care glucose in the 40s and give him D50.  Repeat glucose in the emergency department was 34, so he got another bolus of D50.  Patient was given orange juice in the emergency room with improvement in his symptoms, i.e. lethargy and slow speaking.       Endorses cocaine and alcohol use after working all day without much other oral intake.       * No surgery found *      Hospital Course:   Weaned to RA, Labs stabilized, lactate normalized and BG stable at discharge. Orthostatics stable. Tolerating PO.   SW provided alcohol and drug cessation resources.   Stable for discharge with PCP f/u       Goals of Care Treatment Preferences:  Code Status: Full Code      Consults:     Psychiatric  Cocaine use  -  noted on UDS  - likely cause of pts sxs along with alcohol  - counseled on cessation and given resources      Alcohol use  - noted alcohol use and Ethanol 175 on admit  - likely cause of sxs  - counseled on cessation, will provide resources for cessation       Pulmonary  * Respiratory failure with hypoxia  Patient with Hypoxic Respiratory failure which is Acute.  he is not on home oxygen. Supplemental oxygen was provided and noted-      .   Signs/symptoms of respiratory failure include- hypoxia. Contributing diagnoses includes - Aspiration Labs and images were reviewed. Patient Has recent ABG, which has been reviewed. Will treat underlying causes and adjust management of respiratory failure as follows-     Supplemental O2 started in ED, quickly weaned to RA, maintaining sats >95  CXR with bibasilar atelectasis- IS added  - afebrile and no leukocytosis   - aspiration likely 2/2 alcohol intoxication   - monitor respiratory status    - stable on RA at discharge     Cardiac/Vascular  Palpitations  - PVC noted on EKG  - trop neg      Endocrine  Hypoglycemia  - hypoglycemic in the field likely 2/2 no oral intake and alcohol use, resolved with D50 and oral intake.   - cont oral intake  - monitor BG and hypoglycemic protocol in place    BG normalized and tolerating diet       Other  Elevated lactic acid level  - elevated to 3.71, likely secondary to alcohol/cocaine and decreased oral intake  - trend and cont IVF     - lactate normalized    Near syncope  - patient reports lightheadedness after decreased oral intake and working all day   - also reports alcohol and cocaine use   - orthostatic vitals normal   - cont IVF and electrolyte replacement  - fall precautions         Final Active Diagnoses:    Diagnosis Date Noted POA    PRINCIPAL PROBLEM:  Respiratory failure with hypoxia [J96.91] 08/20/2023 Yes    Hypoglycemia [E16.2] 08/20/2023 Yes    Near syncope [R55] 08/20/2023 Yes    Alcohol use [Z78.9] 08/20/2023 Yes     Cocaine use [F14.90] 08/20/2023 Yes    Elevated lactic acid level [R79.89] 08/20/2023 Yes    Palpitations [R00.2] 08/20/2023 Yes      Problems Resolved During this Admission:       Discharged Condition: stable    Disposition: Home or Self Care    Follow Up:   Follow-up Information     No, Primary Doctor. Schedule an appointment as soon as possible for a visit in 1 week(s).                     Patient Instructions:      Ambulatory referral/consult to Internal Medicine   Standing Status: Future   Referral Priority: Urgent Referral Type: Consultation   Referral Reason: Specialty Services Required   Requested Specialty: Internal Medicine   Number of Visits Requested: 1     Diet Adult Regular     Notify your health care provider if you experience any of the following:  difficulty breathing or increased cough     Notify your health care provider if you experience any of the following:  increased confusion or weakness     Activity as tolerated       Significant Diagnostic Studies: Labs: All labs within the past 24 hours have been reviewed    Pending Diagnostic Studies:     None         Medications:  Reconciled Home Medications:      Medication List      You have not been prescribed any medications.         Indwelling Lines/Drains at time of discharge:   Lines/Drains/Airways     None                 Time spent on the discharge of patient: 40 minutes         Kailey Carbajal MD  Department of Hospital Medicine  [facility] - Intensive Care ([address] [place])

## 2023-08-21 NOTE — ASSESSMENT & PLAN NOTE
- patient reports lightheadedness after decreased oral intake and working all day   - also reports alcohol and cocaine use   - orthostatic vitals normal   - cont IVF and electrolyte replacement  - fall precautions

## 2023-08-21 NOTE — HOSPITAL COURSE
Weaned to RA, Labs stabilized, lactate normalized and BG stable at discharge. Orthostatics stable. Tolerating PO.   SW provided alcohol and drug cessation resources.   Stable for discharge with PCP f/u

## 2023-08-21 NOTE — ASSESSMENT & PLAN NOTE
- hypoglycemic in the field likely 2/2 no oral intake and alcohol use, resolved with D50 and oral intake.   - cont oral intake  - monitor BG and hypoglycemic protocol in place    BG normalized and tolerating diet

## 2023-08-21 NOTE — ASSESSMENT & PLAN NOTE
Patient with Hypoxic Respiratory failure which is Acute.  he is not on home oxygen. Supplemental oxygen was provided and noted-      .   Signs/symptoms of respiratory failure include- hypoxia. Contributing diagnoses includes - Aspiration Labs and images were reviewed. Patient Has recent ABG, which has been reviewed. Will treat underlying causes and adjust management of respiratory failure as follows-     Supplemental O2 started in ED, quickly weaned to RA, maintaining sats >95  CXR with bibasilar atelectasis- IS added  - afebrile and no leukocytosis   - aspiration likely 2/2 alcohol intoxication   - monitor respiratory status    - stable on RA at discharge

## 2023-08-25 LAB
BACTERIA BLD CULT: NORMAL
BACTERIA BLD CULT: NORMAL

## 2023-11-20 PROBLEM — J96.91 RESPIRATORY FAILURE WITH HYPOXIA: Status: RESOLVED | Noted: 2023-08-20 | Resolved: 2023-11-20

## 2025-04-06 ENCOUNTER — HOSPITAL ENCOUNTER (EMERGENCY)
Facility: OTHER | Age: 65
Discharge: HOME | End: 2025-04-06
Attending: EMERGENCY MEDICINE
Payer: MEDICAID

## 2025-04-06 VITALS
WEIGHT: 145 LBS | HEART RATE: 74 BPM | HEIGHT: 69 IN | RESPIRATION RATE: 16 BRPM | OXYGEN SATURATION: 98 % | DIASTOLIC BLOOD PRESSURE: 78 MMHG | TEMPERATURE: 98 F | BODY MASS INDEX: 21.48 KG/M2 | SYSTOLIC BLOOD PRESSURE: 126 MMHG

## 2025-04-06 DIAGNOSIS — R60.9 SWELLING: ICD-10-CM

## 2025-04-06 DIAGNOSIS — S82.55XA CLOSED NONDISPLACED FRACTURE OF MEDIAL MALLEOLUS OF LEFT TIBIA, INITIAL ENCOUNTER: ICD-10-CM

## 2025-04-06 DIAGNOSIS — S92.155A CLOSED NONDISPLACED AVULSION FRACTURE OF LEFT TALUS, INITIAL ENCOUNTER: Primary | ICD-10-CM

## 2025-04-06 PROCEDURE — 25000003 PHARM REV CODE 250: Performed by: NURSE PRACTITIONER

## 2025-04-06 PROCEDURE — 99283 EMERGENCY DEPT VISIT LOW MDM: CPT | Mod: 25

## 2025-04-06 PROCEDURE — 29515 APPLICATION SHORT LEG SPLINT: CPT | Mod: LT

## 2025-04-06 RX ORDER — HYDROCODONE BITARTRATE AND ACETAMINOPHEN 5; 325 MG/1; MG/1
1 TABLET ORAL
Refills: 0 | Status: COMPLETED | OUTPATIENT
Start: 2025-04-06 | End: 2025-04-06

## 2025-04-06 RX ORDER — HYDROCODONE BITARTRATE AND ACETAMINOPHEN 5; 325 MG/1; MG/1
1 TABLET ORAL 4 TIMES DAILY
Qty: 12 TABLET | Refills: 0 | Status: SHIPPED | OUTPATIENT
Start: 2025-04-06 | End: 2025-04-09

## 2025-04-06 RX ADMIN — HYDROCODONE BITARTRATE AND ACETAMINOPHEN 1 TABLET: 5; 325 TABLET ORAL at 09:04

## 2025-04-06 NOTE — ED TRIAGE NOTES
Pt. Is a 64 yr. Old male. Pt. Had a fall yesterday off a porch injuring his left foot. Swelling & bruising is present. Pulses are present. Pt. Is unable to put weight on it. Pt. Is alert and ABC's are intact.

## 2025-04-06 NOTE — ED PROVIDER NOTES
"Source of History:  Patient     Chief complaint:  Ankle Pain (Pt reports porch swing falling on L ankle 2 days ago, states he woke up this morning with increased swelling and bruising to ankle and dorsal aspect of L foot. Pt using wheelchair since injury d/t inability to bear weight.)      HPI:  Mac Alvarado is a 64 y.o. male presenting to the emergency department with c/o left ankle pain x 2 days after he was on a swing and it broke, landing on his left ankle.  Reports swelling/pain and inability to bear weight since.  Denies any knee pain.      This is the extent to the patients complaints today here in the emergency department.    PMH:  As per HPI and below:  History reviewed. No pertinent past medical history.  History reviewed. No pertinent surgical history.    Social History[1]    Review of patient's allergies indicates:  No Known Allergies    ROS: As per HPI and below:  General: No fever.  No chills.  Eyes: No visual changes.   ENT: No sore throat. No ear pain.  Urinary: No abnormal urination.  MSK:  Ankle pain  Integument:  No rashes or lesions.       Physical Exam:    /78 (BP Location: Left arm)   Pulse 74   Temp 97.6 °F (36.4 °C) (Oral)   Resp 16   Ht 5' 9" (1.753 m)   Wt 65.8 kg (145 lb)   SpO2 98%   BMI 21.41 kg/m²   Vitals:    04/06/25 0842 04/06/25 0936   BP: 126/78    Pulse: 74    Resp: 18 16   Temp: 97.6 °F (36.4 °C)    TempSrc: Oral    SpO2: 98%    Weight: 65.8 kg (145 lb)    Height: 5' 9" (1.753 m)        Nursing note and vital signs reviewed.  Appearance: No acute distress.  Eyes: No conjunctival injection.  Extraocular muscles are intact.  ENT: Normal phonation.  Cardio:  DP +2, cap refill less than 2 seconds  Musculoskeletal:  Significant swelling to the left ankle with tenderness to the medial and lateral malleolus.  No deformity.  Pain elicited with any range motion.  No knee tenderness, full range motion of the knees noted.  Skin:  No rashes seen.  Good turgor.  No " abrasions.  No ecchymoses.  Mental Status:  Alert and oriented x 3.  Appropriate, conversant.    Abnormal Labs Reviewed - No data to display    X-Ray Ankle Complete Left   Final Result      1. Medial malleolar and likely lateral talar fractures.         Electronically signed by: Helen Haines MD   Date:    04/06/2025   Time:    09:31            Initial Impression/ Differential Dx:  Differential Diagnosis includes, but is not limited to:  Ankle sprain/strain, tib/fib fracture, ankle disclocation, metatarsal fracture, gout    Medical Decision Making  64-year-old male sustained a left ankle injury after a porch swing fell on it 2 days ago, he has reports he has been unable to ambulate since the incident.  On exam there is swelling and bruising noted to the joint with tenderness to the lateral and medial malleolus.  No deformity.  X-rays reveal a medial malleolar and talar fracture.  Patient was placed in an ankle stirrup splint and provided crutches.  Discussed she needs close follow up with Orthopedics.  Referral was placed with the patient provided contact information for fracture follow up at [facility].    Risk  Prescription drug management.         MDM:         Diagnostic Impression:    1. Closed nondisplaced avulsion fracture of left talus, initial encounter    2. Swelling    3. Closed nondisplaced fracture of medial malleolus of left tibia, initial encounter         ED Disposition Condition    Discharge Stable            ED Prescriptions       Medication Sig Dispense Start Date End Date Auth. Provider    HYDROcodone-acetaminophen (NORCO) 5-325 mg per tablet Take 1 tablet by mouth 4 (four) times daily. for 3 days 12 tablet 4/6/2025 4/9/2025 Rhiannon Elias, FNP          Follow-up Information       Follow up With Specialties Details Why Contact Info    [facility] - Emergency Dept Emergency Medicine Go to  If symptoms worsen [address] [facility] [id number] [phone]    [facility] Scheduling  Call in 1  day  [phone]                 [1]   Social History  Tobacco Use    Smoking status: Never    Smokeless tobacco: Never   Substance Use Topics    Alcohol use: Never    Drug use: Never        Rhiannon Elias, [facility]  04/06/25 1940

## 2025-04-07 ENCOUNTER — TELEPHONE (OUTPATIENT)
Dept: ORTHOPEDICS | Facility: CLINIC | Age: 65
End: 2025-04-07
Payer: MEDICAID

## 2025-04-07 NOTE — TELEPHONE ENCOUNTER
Pt informed that there are no new Medicaid slots open at this time. Pt accepted number to [facility]  ----- Message from Beth sent at 4/7/2025  9:42 AM CDT -----  Type: Patient callWho called: Patient Does the patient know what this is regarding? Requesting a call back in regards to needing to schedule an appt within 3 days ; attempted to schedule but no availability popped up ; patient has closed nondisplaced avulsion fracture of left talus; please advise Would the patient rather a call back or response via [facility]? [facility]: [phone] information:

## 2025-04-09 ENCOUNTER — LAB VISIT (OUTPATIENT)
Dept: LAB | Facility: HOSPITAL | Age: 65
End: 2025-04-09
Payer: MEDICAID

## 2025-04-09 ENCOUNTER — PATIENT OUTREACH (OUTPATIENT)
Dept: ADMINISTRATIVE | Facility: OTHER | Age: 65
End: 2025-04-09
Payer: MEDICAID

## 2025-04-09 ENCOUNTER — OFFICE VISIT (OUTPATIENT)
Facility: CLINIC | Age: 65
End: 2025-04-09
Payer: MEDICAID

## 2025-04-09 VITALS
TEMPERATURE: 98 F | OXYGEN SATURATION: 96 % | HEART RATE: 97 BPM | SYSTOLIC BLOOD PRESSURE: 115 MMHG | WEIGHT: 144.63 LBS | HEIGHT: 66 IN | BODY MASS INDEX: 23.24 KG/M2 | RESPIRATION RATE: 18 BRPM | DIASTOLIC BLOOD PRESSURE: 70 MMHG

## 2025-04-09 DIAGNOSIS — Z00.00 ANNUAL PHYSICAL EXAM: ICD-10-CM

## 2025-04-09 DIAGNOSIS — S82.55XD CLOSED NONDISPLACED FRACTURE OF MEDIAL MALLEOLUS OF LEFT TIBIA WITH ROUTINE HEALING, SUBSEQUENT ENCOUNTER: ICD-10-CM

## 2025-04-09 DIAGNOSIS — S92.155D CLOSED NONDISPLACED AVULSION FRACTURE OF LEFT TALUS WITH ROUTINE HEALING, SUBSEQUENT ENCOUNTER: ICD-10-CM

## 2025-04-09 DIAGNOSIS — Z00.00 ANNUAL PHYSICAL EXAM: Primary | ICD-10-CM

## 2025-04-09 DIAGNOSIS — M79.672 LEFT FOOT PAIN: ICD-10-CM

## 2025-04-09 DIAGNOSIS — Z12.11 SCREENING FOR COLON CANCER: ICD-10-CM

## 2025-04-09 LAB
ABSOLUTE EOSINOPHIL (OHS): 0.12 K/UL
ABSOLUTE MONOCYTE (OHS): 0.63 K/UL (ref 0.3–1)
ABSOLUTE NEUTROPHIL COUNT (OHS): 3.11 K/UL (ref 1.8–7.7)
ALBUMIN SERPL BCP-MCNC: 3.7 G/DL (ref 3.5–5.2)
ALP SERPL-CCNC: 61 UNIT/L (ref 40–150)
ALT SERPL W/O P-5'-P-CCNC: 16 UNIT/L (ref 10–44)
ANION GAP (OHS): 11 MMOL/L (ref 8–16)
AST SERPL-CCNC: 20 UNIT/L (ref 11–45)
BASOPHILS # BLD AUTO: 0.02 K/UL
BASOPHILS NFR BLD AUTO: 0.3 %
BILIRUB SERPL-MCNC: 0.4 MG/DL (ref 0.1–1)
BUN SERPL-MCNC: 14 MG/DL (ref 8–23)
CALCIUM SERPL-MCNC: 10.2 MG/DL (ref 8.7–10.5)
CHLORIDE SERPL-SCNC: 104 MMOL/L (ref 95–110)
CHOLEST SERPL-MCNC: 218 MG/DL (ref 120–199)
CHOLEST/HDLC SERPL: 2.9 {RATIO} (ref 2–5)
CO2 SERPL-SCNC: 27 MMOL/L (ref 23–29)
CREAT SERPL-MCNC: 0.8 MG/DL (ref 0.5–1.4)
ERYTHROCYTE [DISTWIDTH] IN BLOOD BY AUTOMATED COUNT: 12.7 % (ref 11.5–14.5)
GFR SERPLBLD CREATININE-BSD FMLA CKD-EPI: >60 ML/MIN/1.73/M2
GLUCOSE SERPL-MCNC: 52 MG/DL (ref 70–110)
HCT VFR BLD AUTO: 46.3 % (ref 40–54)
HDLC SERPL-MCNC: 75 MG/DL (ref 40–75)
HDLC SERPL: 34.4 % (ref 20–50)
HGB BLD-MCNC: 14.3 GM/DL (ref 14–18)
IMM GRANULOCYTES # BLD AUTO: 0.01 K/UL (ref 0–0.04)
IMM GRANULOCYTES NFR BLD AUTO: 0.2 % (ref 0–0.5)
LDLC SERPL CALC-MCNC: 118.6 MG/DL (ref 63–159)
LYMPHOCYTES # BLD AUTO: 2.53 K/UL (ref 1–4.8)
MCH RBC QN AUTO: 30.2 PG (ref 27–31)
MCHC RBC AUTO-ENTMCNC: 30.9 G/DL (ref 32–36)
MCV RBC AUTO: 98 FL (ref 82–98)
NONHDLC SERPL-MCNC: 143 MG/DL
NUCLEATED RBC (/100WBC) (OHS): 0 /100 WBC
PLATELET # BLD AUTO: 383 K/UL (ref 150–450)
PMV BLD AUTO: 10.5 FL (ref 9.2–12.9)
POTASSIUM SERPL-SCNC: 3.9 MMOL/L (ref 3.5–5.1)
PROT SERPL-MCNC: 7.2 GM/DL (ref 6–8.4)
RBC # BLD AUTO: 4.73 M/UL (ref 4.6–6.2)
RELATIVE EOSINOPHIL (OHS): 1.9 %
RELATIVE LYMPHOCYTE (OHS): 39.4 % (ref 18–48)
RELATIVE MONOCYTE (OHS): 9.8 % (ref 4–15)
RELATIVE NEUTROPHIL (OHS): 48.4 % (ref 38–73)
SODIUM SERPL-SCNC: 142 MMOL/L (ref 136–145)
TRIGL SERPL-MCNC: 122 MG/DL (ref 30–150)
TSH SERPL-ACNC: 0.45 UIU/ML (ref 0.4–4)
WBC # BLD AUTO: 6.42 K/UL (ref 3.9–12.7)

## 2025-04-09 PROCEDURE — 80053 COMPREHEN METABOLIC PANEL: CPT

## 2025-04-09 PROCEDURE — 86803 HEPATITIS C AB TEST: CPT

## 2025-04-09 PROCEDURE — 85025 COMPLETE CBC W/AUTO DIFF WBC: CPT

## 2025-04-09 PROCEDURE — 3008F BODY MASS INDEX DOCD: CPT | Mod: CPTII,,,

## 2025-04-09 PROCEDURE — 80061 LIPID PANEL: CPT

## 2025-04-09 PROCEDURE — 99999 PR PBB SHADOW E&M-EST. PATIENT-LVL V: CPT | Mod: PBBFAC,,,

## 2025-04-09 PROCEDURE — 1159F MED LIST DOCD IN RCRD: CPT | Mod: CPTII,,,

## 2025-04-09 PROCEDURE — 36415 COLL VENOUS BLD VENIPUNCTURE: CPT | Mod: PN

## 2025-04-09 PROCEDURE — 99214 OFFICE O/P EST MOD 30 MIN: CPT | Mod: S$PBB,25,,

## 2025-04-09 PROCEDURE — 99386 PREV VISIT NEW AGE 40-64: CPT | Mod: S$PBB,,,

## 2025-04-09 PROCEDURE — 87389 HIV-1 AG W/HIV-1&-2 AB AG IA: CPT

## 2025-04-09 PROCEDURE — 84443 ASSAY THYROID STIM HORMONE: CPT

## 2025-04-09 PROCEDURE — 1160F RVW MEDS BY RX/DR IN RCRD: CPT | Mod: CPTII,,,

## 2025-04-09 PROCEDURE — 3078F DIAST BP <80 MM HG: CPT | Mod: CPTII,,,

## 2025-04-09 PROCEDURE — 3074F SYST BP LT 130 MM HG: CPT | Mod: CPTII,,,

## 2025-04-09 PROCEDURE — 3044F HG A1C LEVEL LT 7.0%: CPT | Mod: CPTII,,,

## 2025-04-09 PROCEDURE — 83036 HEMOGLOBIN GLYCOSYLATED A1C: CPT

## 2025-04-09 PROCEDURE — 99215 OFFICE O/P EST HI 40 MIN: CPT | Mod: PBBFAC,PN

## 2025-04-09 RX ORDER — HYDROCODONE BITARTRATE AND ACETAMINOPHEN 5; 325 MG/1; MG/1
1 TABLET ORAL 4 TIMES DAILY
COMMUNITY
Start: 2025-04-06

## 2025-04-09 RX ORDER — IBUPROFEN 600 MG/1
600 TABLET ORAL 3 TIMES DAILY PRN
Qty: 25 TABLET | Refills: 0 | Status: SHIPPED | OUTPATIENT
Start: 2025-04-09 | End: 2025-04-17

## 2025-04-09 NOTE — PROGRESS NOTES
CHW - Initial Contact    This Community Health Worker completed the Social Determinant of Health questionnaire with patient during clinic visit today.    Pt identified barriers of most importance are: Patient did not report any barriers.    Referrals to community agencies completed with patient/caregiver consent outside of [facility] include: No.  Referrals were put through [facility] - no:   Support and Services: No.  Other information discussed the patient needs / wants help with: SDOH completed. Patient did not report any barriers at this time. Patient's case will be closed   Follow up required: No.

## 2025-04-10 LAB
EAG (OHS): 105 MG/DL (ref 68–131)
HBA1C MFR BLD: 5.3 % (ref 4–5.6)
HCV AB SERPL QL IA: NORMAL
HIV 1+2 AB+HIV1 P24 AG SERPL QL IA: NORMAL

## 2025-04-17 ENCOUNTER — HOSPITAL ENCOUNTER (EMERGENCY)
Facility: OTHER | Age: 65
Discharge: HOME | End: 2025-04-17
Attending: EMERGENCY MEDICINE
Payer: MEDICAID

## 2025-04-17 VITALS
BODY MASS INDEX: 21.33 KG/M2 | HEIGHT: 69 IN | HEART RATE: 66 BPM | SYSTOLIC BLOOD PRESSURE: 148 MMHG | WEIGHT: 144 LBS | DIASTOLIC BLOOD PRESSURE: 92 MMHG | RESPIRATION RATE: 15 BRPM | TEMPERATURE: 98 F | OXYGEN SATURATION: 96 %

## 2025-04-17 DIAGNOSIS — S82.892A CLOSED FRACTURE OF LEFT ANKLE, INITIAL ENCOUNTER: Primary | ICD-10-CM

## 2025-04-17 PROCEDURE — 25000003 PHARM REV CODE 250: Performed by: PHYSICIAN ASSISTANT

## 2025-04-17 PROCEDURE — 99283 EMERGENCY DEPT VISIT LOW MDM: CPT

## 2025-04-17 RX ORDER — IBUPROFEN 600 MG/1
600 TABLET ORAL
Status: COMPLETED | OUTPATIENT
Start: 2025-04-17 | End: 2025-04-17

## 2025-04-17 RX ORDER — IBUPROFEN 600 MG/1
600 TABLET ORAL EVERY 6 HOURS PRN
Qty: 20 TABLET | Refills: 0 | Status: SHIPPED | OUTPATIENT
Start: 2025-04-17

## 2025-04-17 RX ORDER — TRAMADOL HYDROCHLORIDE 50 MG/1
50 TABLET ORAL
Status: COMPLETED | OUTPATIENT
Start: 2025-04-17 | End: 2025-04-17

## 2025-04-17 RX ADMIN — IBUPROFEN 600 MG: 600 TABLET, FILM COATED ORAL at 12:04

## 2025-04-17 RX ADMIN — TRAMADOL HYDROCHLORIDE 50 MG: 50 TABLET, FILM COATED ORAL at 12:04

## 2025-04-17 NOTE — FIRST PROVIDER EVALUATION
" Emergency Department TeleTriage Encounter Note      CHIEF COMPLAINT    Chief Complaint   Patient presents with    Ankle Pain     Diagnosed with fracture of L tibia and talus and had it splinted here on the 6th. C/o pain rated 9/10. Reports the splint is "loose" and needs to be re-applied. Has not seen orthopedic doc yet.        VITAL SIGNS   Initial Vitals [04/17/25 1046]   BP Pulse Resp Temp SpO2   (!) 148/92 66 18 97.7 °F (36.5 °C) 96 %      MAP       --            ALLERGIES    Review of patient's allergies indicates:  No Known Allergies    PROVIDER TRIAGE NOTE  This is a teletriage evaluation of a 64 y.o. male presenting to the ED complaining of splint check.  Believes his splint is too loose. No new injury.    Alert, no distress.     Initial orders will be placed and care will be transferred to an alternate provider when patient is roomed for a full evaluation. Any additional orders and the final disposition will be determined by that provider.         ORDERS  Labs Reviewed - No data to display    ED Orders (720h ago, onward)      None              Virtual Visit Note: The provider triage portion of this emergency department evaluation and documentation was performed via [employer], a HIPAA-compliant telemedicine application, in concert with a tele-presenter in the room. A face to face patient evaluation with one of my colleagues will occur once the patient is placed in an emergency department room.      DISCLAIMER: This note was prepared with [employer] voice recognition transcription software. Garbled syntax, mangled pronouns, and other bizarre constructions may be attributed to that software system.    "
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
## 2025-04-20 ENCOUNTER — RESULTS FOLLOW-UP (OUTPATIENT)
Facility: CLINIC | Age: 65
End: 2025-04-20
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
## 2025-04-24 ENCOUNTER — LAB VISIT (OUTPATIENT)
Dept: LAB | Facility: HOSPITAL | Age: 65
End: 2025-04-24
Payer: MEDICAID

## 2025-04-24 DIAGNOSIS — Z12.11 SCREENING FOR COLON CANCER: ICD-10-CM

## 2025-04-24 LAB — OB PNL STL IA: NEGATIVE

## 2025-04-24 PROCEDURE — 82274 ASSAY TEST FOR BLOOD FECAL: CPT

## 2025-05-06 ENCOUNTER — TELEPHONE (OUTPATIENT)
Facility: CLINIC | Age: 65
End: 2025-05-06
Payer: MEDICAID

## 2025-06-20 ENCOUNTER — TELEPHONE (OUTPATIENT)
Facility: CLINIC | Age: 65
End: 2025-06-20